# Patient Record
Sex: MALE | Race: WHITE | Employment: OTHER | URBAN - METROPOLITAN AREA
[De-identification: names, ages, dates, MRNs, and addresses within clinical notes are randomized per-mention and may not be internally consistent; named-entity substitution may affect disease eponyms.]

---

## 2018-09-17 ENCOUNTER — RECORDS - HEALTHEAST (OUTPATIENT)
Dept: LAB | Facility: CLINIC | Age: 78
End: 2018-09-17

## 2018-09-17 LAB — POTASSIUM BLD-SCNC: 3 MMOL/L (ref 3.5–5)

## 2018-09-19 ENCOUNTER — RECORDS - HEALTHEAST (OUTPATIENT)
Dept: LAB | Facility: CLINIC | Age: 78
End: 2018-09-19

## 2018-09-20 LAB — POTASSIUM BLD-SCNC: 3.5 MMOL/L (ref 3.5–5)

## 2018-10-01 ENCOUNTER — RECORDS - HEALTHEAST (OUTPATIENT)
Dept: LAB | Facility: CLINIC | Age: 78
End: 2018-10-01

## 2018-10-01 LAB — POTASSIUM BLD-SCNC: 3.2 MMOL/L (ref 3.5–5)

## 2020-11-24 ENCOUNTER — VIRTUAL VISIT (OUTPATIENT)
Dept: GERIATRICS | Facility: CLINIC | Age: 80
End: 2020-11-24
Payer: MEDICARE

## 2020-11-24 VITALS
SYSTOLIC BLOOD PRESSURE: 147 MMHG | OXYGEN SATURATION: 95 % | RESPIRATION RATE: 18 BRPM | HEART RATE: 67 BPM | TEMPERATURE: 98.3 F | DIASTOLIC BLOOD PRESSURE: 97 MMHG

## 2020-11-24 DIAGNOSIS — I69.90 LATE EFFECTS OF CVA (CEREBROVASCULAR ACCIDENT): ICD-10-CM

## 2020-11-24 DIAGNOSIS — U07.1 PNEUMONIA DUE TO COVID-19 VIRUS: Primary | ICD-10-CM

## 2020-11-24 DIAGNOSIS — I48.20 CHRONIC ATRIAL FIBRILLATION (H): ICD-10-CM

## 2020-11-24 DIAGNOSIS — J12.82 PNEUMONIA DUE TO COVID-19 VIRUS: Primary | ICD-10-CM

## 2020-11-24 DIAGNOSIS — I10 ESSENTIAL HYPERTENSION: ICD-10-CM

## 2020-11-24 DIAGNOSIS — Z79.01 CHRONIC ANTICOAGULATION: ICD-10-CM

## 2020-11-24 PROBLEM — H54.8 LEGALLY BLIND: Status: ACTIVE | Noted: 2020-11-24

## 2020-11-24 PROBLEM — J96.01 ACUTE HYPOXEMIC RESPIRATORY FAILURE DUE TO COVID-19 (H): Status: ACTIVE | Noted: 2020-11-15

## 2020-11-24 PROBLEM — N17.9 AKI (ACUTE KIDNEY INJURY) (H): Status: ACTIVE | Noted: 2020-11-16

## 2020-11-24 PROCEDURE — 99309 SBSQ NF CARE MODERATE MDM 30: CPT | Mod: 95 | Performed by: NURSE PRACTITIONER

## 2020-11-24 RX ORDER — LANOLIN ALCOHOL/MO/W.PET/CERES
1 CREAM (GRAM) TOPICAL AT BEDTIME
COMMUNITY
End: 2021-01-07

## 2020-11-24 RX ORDER — POLYETHYLENE GLYCOL 3350 17 G/17G
1 POWDER, FOR SOLUTION ORAL DAILY
COMMUNITY

## 2020-11-24 RX ORDER — ONDANSETRON 4 MG/1
4 TABLET, FILM COATED ORAL EVERY 6 HOURS PRN
COMMUNITY

## 2020-11-24 RX ORDER — LOPERAMIDE HYDROCHLORIDE 2 MG/1
2 TABLET ORAL PRN
COMMUNITY

## 2020-11-24 RX ORDER — CARBOXYMETHYLCELLULOSE SODIUM 5 MG/ML
1 SOLUTION/ DROPS OPHTHALMIC 4 TIMES DAILY
COMMUNITY

## 2020-11-24 RX ORDER — AMOXICILLIN 250 MG
1 CAPSULE ORAL DAILY PRN
COMMUNITY

## 2020-11-24 RX ORDER — FLUOXETINE 20 MG/1
20 TABLET, FILM COATED ORAL DAILY
COMMUNITY

## 2020-11-24 RX ORDER — BUPROPION HYDROCHLORIDE 150 MG/1
150 TABLET ORAL EVERY MORNING
COMMUNITY

## 2020-11-24 RX ORDER — ATORVASTATIN CALCIUM 10 MG/1
10 TABLET, FILM COATED ORAL AT BEDTIME
COMMUNITY

## 2020-11-24 RX ORDER — BISACODYL 10 MG
10 SUPPOSITORY, RECTAL RECTAL 2 TIMES DAILY PRN
COMMUNITY

## 2020-11-24 RX ORDER — AMLODIPINE BESYLATE 10 MG/1
10 TABLET ORAL DAILY
COMMUNITY

## 2020-11-24 RX ORDER — CHOLECALCIFEROL (VITAMIN D3) 50 MCG
1 TABLET ORAL DAILY
COMMUNITY

## 2020-11-24 RX ORDER — ACETAMINOPHEN 325 MG/1
650 TABLET ORAL EVERY 6 HOURS PRN
COMMUNITY

## 2020-11-24 RX ORDER — TAMSULOSIN HYDROCHLORIDE 0.4 MG/1
0.4 CAPSULE ORAL DAILY
COMMUNITY

## 2020-11-24 NOTE — PROGRESS NOTES
" [place] [facility]  Chris Orozco is being evaluated via a billable video.   The patient has been notified of following:  \"This video visit will be conducted via a call between you and your provider. We have found that certain health care needs can be provided without the need for an in-person physical exam.  This service lets us provide the care you need with a video conversation. If during the course of the call the provider feels a video visit is not appropriate, you will not be charged for this service.\"   The provider has received verbal consent for a Video Visit from the patient and or first contact? Yes  Patient/facility staff would like the video invitation sent by: N/A   Video Start Time: 10:23am  Which Facility the Patient is at during the time of visit: [facility]    Received verbal consent to use Care Everywhere in order to access labs, documents, histories, and all other needed information to provide care at current facility.    PRIMARY CARE PROVIDER AND CLINIC:  No primary care provider on file., No primary physician on file.  Chief Complaint   Patient presents with     Hospital F/U     [place] Medical Record Number:  [id number]  Chris Orozco  is a 80 year old  (1940), admitted to the above facility from  [facility] . Hospital stay 11/15/20 through 11/23/20. Admitted to this facility for  rehab, medical management and nursing care.    HPI:    HPI information obtained from: facility chart records, facility staff, patient report and Care Everywhere Epic chart review.   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 He presented to the ED with fever, malaise, respiratory distress and positive COVID test. He required BiPAP for respiratory support, weaned down to 2.5L NC at discharge. He received remdesivir and dexamethasone. He was also treated for possible aspiration pneumonia with " cefepime/doxycycline.    Updates on Status Since Skilled nursing Admission:   Patient is currently on 2L oxygen. He denies SOB, cough, malaise, N/V/D. No fever. HR 60-70s. -140s/70-90s.    CODE STATUS/ADVANCE DIRECTIVES DISCUSSION:   DNR / DNI  Patient's living condition: lives in an assisted living facility  ALLERGIES: Ciprofloxacin  PAST MEDICAL HISTORY:  has no past medical history on file.  PAST SURGICAL HISTORY:   has no past surgical history on file.  FAMILY HISTORY: family history is not on file.  SOCIAL HISTORY:     Current Outpatient Medications   Medication Sig Dispense Refill     acetaminophen (TYLENOL) 325 MG tablet Take 650 mg by mouth every 6 hours as needed for mild pain       amLODIPine (NORVASC) 10 MG tablet Take 10 mg by mouth daily       atorvastatin (LIPITOR) 10 MG tablet Take 10 mg by mouth At Bedtime       bisacodyl (DULCOLAX) 10 MG suppository Place 10 mg rectally 2 times daily as needed for constipation Administer if no BM x 3 days       buPROPion (WELLBUTRIN XL) 150 MG 24 hr tablet Take 150 mg by mouth every morning       carboxymethylcellulose PF (REFRESH PLUS) 0.5 % ophthalmic solution Place 1 drop into both eyes 4 times daily       FLUoxetine 20 MG tablet Take 20 mg by mouth daily       hypromellose (GENTEAL) 0.3 % SOLN ophthalmic solution Place 1 drop into both eyes 2 times daily       loperamide (IMODIUM A-D) 2 MG tablet Take 2 mg by mouth as needed for diarrhea Give after each loose stool       melatonin 3 MG tablet Take 1 mg by mouth At Bedtime       omeprazole (PRILOSEC) 20 MG DR capsule Take 20 mg by mouth daily       ondansetron (ZOFRAN) 4 MG tablet Take 4 mg by mouth every 6 hours as needed for nausea       polyethylene glycol (MIRALAX) 17 g packet Take 1 packet by mouth daily       rivaroxaban ANTICOAGULANT (XARELTO) 20 MG TABS tablet Take 20 mg by mouth daily (with dinner)       senna-docusate (SENOKOT-S/PERICOLACE) 8.6-50 MG tablet Take 1 tablet by mouth daily as needed  for constipation       tamsulosin (FLOMAX) 0.4 MG capsule Take 0.4 mg by mouth daily       vitamin D3 (CHOLECALCIFEROL) 50 mcg (2000 units) tablet Take 1 tablet by mouth daily        discharge medications reconciled, continue medications without change    ROS: 10 point ROS of systems including Constitutional, Eyes, Respiratory, Cardiovascular, Gastroenterology, Genitourinary, Integumentary, Musculoskeletal, Psychiatric were all negative except for pertinent positives noted in my HPI.    Vitals:BP (!) 147/97   Pulse 67   Temp 98.3  F (36.8  C)   Resp 18   SpO2 95%    Limited Visit Exam done given COVID-19 precautions:  GENERAL APPEARANCE:  Alert, in no distress  EYES:  Conjunctiva and lids normal  RESP:  no respiratory distress  PSYCH:  oriented X 3, insight and judgement impaired, memory impaired , affect and mood normal    Lab/Diagnostic data:  Recent labs in [facility] reviewed by me today.     ASSESSMENT/PLAN:  (U07.1,  J12.89) Pneumonia due to COVID-19 virus  (primary encounter diagnosis)  Comment: Improving. Due to severe illness, he requires 20 days of isolation from positive test. He likely will need ongoing TCU after that period for further strengthening before he could return to [facility].  Plan: Wean oxygen as able. Monitor vitals. PT/OT eval and treat, discharge planning per their recommendations.    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 This may slow his progress in therapy  Plan: Continue current POC with no changes at this time and adjustments as needed.    (I10) Essential hypertension  Comment: Controlled. To avoid risk of hypotension, falls, dizziness and tissue hypoperfusion, recommend  BP goal is < 150/90mmHg.  Plan: Continue current POC with no changes at this time and adjustments as needed.    (I48.20) Chronic atrial fibrillation (H)  (Z79.01) Chronic anticoagulation  Comment: Rate controlled. No s/sx side effects with DOAC  Plan: Continue  current POC with no changes at this time and adjustments as needed.      Electronically signed by:  ADRIANA Hernandes CNP   [facility]  Phone: [phone]      Video-Visit Details  Type of service:  Video Visit  Video End Time (time video stopped): 10:26am  Distant Location (provider location):  [facility]

## 2020-11-24 NOTE — LETTER
"    11/24/2020        RE: Chris Orozco  [address] [address]         [place] [facility]  Chris Orozco is being evaluated via a billable video.   The patient has been notified of following:  \"This video visit will be conducted via a call between you and your provider. We have found that certain health care needs can be provided without the need for an in-person physical exam.  This service lets us provide the care you need with a video conversation. If during the course of the call the provider feels a video visit is not appropriate, you will not be charged for this service.\"   The provider has received verbal consent for a Video Visit from the patient and or first contact? Yes  Patient/facility staff would like the video invitation sent by: N/A   Video Start Time: 10:23am  Which Facility the Patient is at during the time of visit: [facility]    Received verbal consent to use Care Everywhere in order to access labs, documents, histories, and all other needed information to provide care at current facility.    PRIMARY CARE PROVIDER AND CLINIC:  No primary care provider on file., No primary physician on file.  Chief Complaint   Patient presents with     Hospital F/U     [place] Medical Record Number:  [id number]  Chris Orozco  is a 80 year old  (1940), admitted to the above facility from  [facility] . Hospital stay 11/15/20 through 11/23/20. Admitted to this facility for  rehab, medical management and nursing care.    HPI:    HPI information obtained from: facility chart records, facility staff, patient report and Care Everywhere Epic chart review.   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 He presented to the ED with fever, malaise, respiratory distress and positive COVID test. He required BiPAP for respiratory support, weaned down to 2.5L NC at discharge. He received remdesivir " and dexamethasone. He was also treated for possible aspiration pneumonia with cefepime/doxycycline.    Updates on Status Since Skilled nursing Admission:   Patient is currently on 2L oxygen. He denies SOB, cough, malaise, N/V/D. No fever. HR 60-70s. -140s/70-90s.    CODE STATUS/ADVANCE DIRECTIVES DISCUSSION:   DNR / DNI  Patient's living condition: lives in an assisted living facility  ALLERGIES: Ciprofloxacin  PAST MEDICAL HISTORY:  has no past medical history on file.  PAST SURGICAL HISTORY:   has no past surgical history on file.  FAMILY HISTORY: family history is not on file.  SOCIAL HISTORY:     Current Outpatient Medications   Medication Sig Dispense Refill     acetaminophen (TYLENOL) 325 MG tablet Take 650 mg by mouth every 6 hours as needed for mild pain       amLODIPine (NORVASC) 10 MG tablet Take 10 mg by mouth daily       atorvastatin (LIPITOR) 10 MG tablet Take 10 mg by mouth At Bedtime       bisacodyl (DULCOLAX) 10 MG suppository Place 10 mg rectally 2 times daily as needed for constipation Administer if no BM x 3 days       buPROPion (WELLBUTRIN XL) 150 MG 24 hr tablet Take 150 mg by mouth every morning       carboxymethylcellulose PF (REFRESH PLUS) 0.5 % ophthalmic solution Place 1 drop into both eyes 4 times daily       FLUoxetine 20 MG tablet Take 20 mg by mouth daily       hypromellose (GENTEAL) 0.3 % SOLN ophthalmic solution Place 1 drop into both eyes 2 times daily       loperamide (IMODIUM A-D) 2 MG tablet Take 2 mg by mouth as needed for diarrhea Give after each loose stool       melatonin 3 MG tablet Take 1 mg by mouth At Bedtime       omeprazole (PRILOSEC) 20 MG DR capsule Take 20 mg by mouth daily       ondansetron (ZOFRAN) 4 MG tablet Take 4 mg by mouth every 6 hours as needed for nausea       polyethylene glycol (MIRALAX) 17 g packet Take 1 packet by mouth daily       rivaroxaban ANTICOAGULANT (XARELTO) 20 MG TABS tablet Take 20 mg by mouth daily (with dinner)       senna-docusate  (SENOKOT-S/PERICOLACE) 8.6-50 MG tablet Take 1 tablet by mouth daily as needed for constipation       tamsulosin (FLOMAX) 0.4 MG capsule Take 0.4 mg by mouth daily       vitamin D3 (CHOLECALCIFEROL) 50 mcg (2000 units) tablet Take 1 tablet by mouth daily        discharge medications reconciled, continue medications without change    ROS: 10 point ROS of systems including Constitutional, Eyes, Respiratory, Cardiovascular, Gastroenterology, Genitourinary, Integumentary, Musculoskeletal, Psychiatric were all negative except for pertinent positives noted in my HPI.    Vitals:BP (!) 147/97   Pulse 67   Temp 98.3  F (36.8  C)   Resp 18   SpO2 95%    Limited Visit Exam done given COVID-19 precautions:  GENERAL APPEARANCE:  Alert, in no distress  EYES:  Conjunctiva and lids normal  RESP:  no respiratory distress  PSYCH:  oriented X 3, insight and judgement impaired, memory impaired , affect and mood normal    Lab/Diagnostic data:  Recent labs in [facility] reviewed by me today.     ASSESSMENT/PLAN:  (U07.1,  J12.89) Pneumonia due to COVID-19 virus  (primary encounter diagnosis)  Comment: Improving. Due to severe illness, he requires 20 days of isolation from positive test. He likely will need ongoing TCU after that period for further strengthening before he could return to [facility].  Plan: Wean oxygen as able. Monitor vitals. PT/OT eval and treat, discharge planning per their recommendations.    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 This may slow his progress in therapy  Plan: Continue current POC with no changes at this time and adjustments as needed.    (I10) Essential hypertension  Comment: Controlled. To avoid risk of hypotension, falls, dizziness and tissue hypoperfusion, recommend  BP goal is < 150/90mmHg.  Plan: Continue current POC with no changes at this time and adjustments as needed.    (I48.20) Chronic atrial fibrillation (H)  (Z79.01) Chronic  anticoagulation  Comment: Rate controlled. No s/sx side effects with DOAC  Plan: Continue current POC with no changes at this time and adjustments as needed.      Electronically signed by:  ADRIANA Hernandes CNP   [facility]  Phone: [phone]      Video-Visit Details  Type of service:  Video Visit  Video End Time (time video stopped): 10:26am  Distant Location (provider location):  [facility]

## 2020-12-01 ENCOUNTER — VIRTUAL VISIT (OUTPATIENT)
Dept: GERIATRICS | Facility: CLINIC | Age: 80
End: 2020-12-01
Payer: MEDICARE

## 2020-12-01 VITALS
OXYGEN SATURATION: 91 % | HEIGHT: 76 IN | WEIGHT: 212.6 LBS | RESPIRATION RATE: 18 BRPM | TEMPERATURE: 98.7 F | DIASTOLIC BLOOD PRESSURE: 78 MMHG | BODY MASS INDEX: 25.89 KG/M2 | SYSTOLIC BLOOD PRESSURE: 127 MMHG | HEART RATE: 73 BPM

## 2020-12-01 DIAGNOSIS — I48.20 CHRONIC ATRIAL FIBRILLATION (H): ICD-10-CM

## 2020-12-01 DIAGNOSIS — J12.82 PNEUMONIA DUE TO COVID-19 VIRUS: Primary | ICD-10-CM

## 2020-12-01 DIAGNOSIS — I10 ESSENTIAL HYPERTENSION: ICD-10-CM

## 2020-12-01 DIAGNOSIS — Z79.01 CHRONIC ANTICOAGULATION: ICD-10-CM

## 2020-12-01 DIAGNOSIS — I69.90 LATE EFFECTS OF CVA (CEREBROVASCULAR ACCIDENT): ICD-10-CM

## 2020-12-01 DIAGNOSIS — U07.1 PNEUMONIA DUE TO COVID-19 VIRUS: Primary | ICD-10-CM

## 2020-12-01 PROBLEM — J96.01 ACUTE HYPOXEMIC RESPIRATORY FAILURE DUE TO COVID-19 (H): Status: RESOLVED | Noted: 2020-11-15 | Resolved: 2020-12-01

## 2020-12-01 PROCEDURE — 99309 SBSQ NF CARE MODERATE MDM 30: CPT | Mod: 95 | Performed by: NURSE PRACTITIONER

## 2020-12-01 ASSESSMENT — MIFFLIN-ST. JEOR: SCORE: 1775.85

## 2020-12-01 NOTE — LETTER
"    12/1/2020        RE: Chris Orozco  [address] [address]        [place] [facility]   Chris Orozco is being evaluated via a billable video visit.   The patient has been notified of following:  \"This video visit will be conducted via a call between you and your provider. We have found that certain health care needs can be provided without the need for an in-person physical exam.  This service lets us provide the care you need with a video conversation. If during the course of the call the provider feels a video visit is not appropriate, you will not be charged for this service.\"   The provider has received verbal consent for a Video Visit from the patient or first contact? Yes  Patient  or facility staff would like the video invitation sent by: N/A   Video Start Time: 10:55am    [place] Medical Record Number:  [id number]  Place of Location at the time of visit: [facility]  Chief Complaint   Patient presents with     RECHECK     HPI:  Chris Orozco  is a 80 year old (1940), who is being seen today for a visit. [facility] stay 11/15/20 through 11/23/20. Admitted to this facility for  rehab, medical management and nursing care. 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 He presented to the ED with fever, malaise, respiratory distress and positive COVID test. He required BiPAP for respiratory support, weaned down to 2.5L NC at discharge. He received remdesivir and dexamethasone. He was also treated for possible aspiration pneumonia with cefepime/doxycycline.     HPI information obtained from: facility chart records, facility staff and patient report.     Today's concern is:  Pneumonia due to COVID-19 virus  Remains on 2 liters of oxygen. He says he feels a little tired and weak still. No SOB, cough, N/V/D. Appetite is good. No fever.    Late effects of CVA (cerebrovascular accident)  Chronic left weakness and " dysarthria    Essential hypertension  -140s/70-90s    Chronic atrial fibrillation (H)  Chronic anticoagulation  HR 60-70s. On apixaban      Past Medical and Surgical History reviewed in Epic today.  MEDICATIONS:    Current Outpatient Medications   Medication Sig Dispense Refill     acetaminophen (TYLENOL) 325 MG tablet Take 650 mg by mouth every 6 hours as needed for mild pain       amLODIPine (NORVASC) 10 MG tablet Take 10 mg by mouth daily       atorvastatin (LIPITOR) 10 MG tablet Take 10 mg by mouth At Bedtime       bisacodyl (DULCOLAX) 10 MG suppository Place 10 mg rectally 2 times daily as needed for constipation Administer if no BM x 3 days       buPROPion (WELLBUTRIN XL) 150 MG 24 hr tablet Take 150 mg by mouth every morning       carboxymethylcellulose PF (REFRESH PLUS) 0.5 % ophthalmic solution Place 1 drop into both eyes 4 times daily       FLUoxetine 20 MG tablet Take 20 mg by mouth daily       hypromellose (GENTEAL) 0.3 % SOLN ophthalmic solution Place 1 drop into both eyes 2 times daily       loperamide (IMODIUM A-D) 2 MG tablet Take 2 mg by mouth as needed for diarrhea Give after each loose stool       melatonin 3 MG tablet Take 1 mg by mouth At Bedtime       omeprazole (PRILOSEC) 20 MG DR capsule Take 20 mg by mouth daily       ondansetron (ZOFRAN) 4 MG tablet Take 4 mg by mouth every 6 hours as needed for nausea       polyethylene glycol (MIRALAX) 17 g packet Take 1 packet by mouth daily       rivaroxaban ANTICOAGULANT (XARELTO) 20 MG TABS tablet Take 20 mg by mouth daily (with dinner)       senna-docusate (SENOKOT-S/PERICOLACE) 8.6-50 MG tablet Take 1 tablet by mouth daily as needed for constipation       tamsulosin (FLOMAX) 0.4 MG capsule Take 0.4 mg by mouth daily       vitamin D3 (CHOLECALCIFEROL) 50 mcg (2000 units) tablet Take 1 tablet by mouth daily       REVIEW OF SYSTEMS: 10 point ROS of systems including Constitutional, Eyes, Respiratory, Cardiovascular, Gastroenterology,  "Genitourinary, Integumentary, Musculoskeletal, Psychiatric were all negative except for pertinent positives noted in my HPI.    Objective: /78   Pulse 73   Temp 98.7  F (37.1  C)   Resp 18   Ht 1.93 m (6' 4\")   Wt 96.4 kg (212 lb 9.6 oz)   SpO2 91%   BMI 25.88 kg/m    Limited visit exam done given COVID-19 precautions.   GENERAL APPEARANCE:  Alert, in no distress  EYES:  Conjunctiva and lids normal  RESP:  no respiratory distress  PSYCH:  oriented X 3, insight and judgement impaired, memory impaired , affect and mood normal    Labs:   none    ASSESSMENT/PLAN:  (U07.1,  J12.89) Pneumonia due to COVID-19 virus  (primary encounter diagnosis)  Comment: Acute illness resolved. Fatigue and weakness can take some time to resolve. He may be removed from isolation at 20 days from positive test  Plan: PT/OT eval and treat, discharge planning per their recommendations.    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    (I10) Essential hypertension  Comment: Chronic, controlled  Plan: Continue current POC with no changes at this time and adjustments as needed.    (I48.20) Chronic atrial fibrillation (H)  (Z79.01) Chronic anticoagulation  Comment: Rate controlled. No evidence of side effects with DOAC  Plan: Continue current POC with no changes at this time and adjustments as needed.      Electronically signed by:  ADRIANA Hernandes CNP   [facility]  Phone: [phone]      Video-Visit Details  Type of service:  Video Visit  Video End Time (time video stopped): 10:57am  Distant Location (provider location):  [place] [facility]                   "

## 2020-12-01 NOTE — PROGRESS NOTES
"[place] [facility]   Chris Orozco is being evaluated via a billable video visit.   The patient has been notified of following:  \"This video visit will be conducted via a call between you and your provider. We have found that certain health care needs can be provided without the need for an in-person physical exam.  This service lets us provide the care you need with a video conversation. If during the course of the call the provider feels a video visit is not appropriate, you will not be charged for this service.\"   The provider has received verbal consent for a Video Visit from the patient or first contact? Yes  Patient  or facility staff would like the video invitation sent by: N/A   Video Start Time: 10:55am    [place] Medical Record Number:  [id number]  Place of Location at the time of visit: [facility]  Chief Complaint   Patient presents with     RECHECK     HPI:  Chris Orozco  is a 80 year old (1940), who is being seen today for a visit. [facility] stay 11/15/20 through 11/23/20. Admitted to this facility for  rehab, medical management and nursing care. 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 He presented to the ED with fever, malaise, respiratory distress and positive COVID test. He required BiPAP for respiratory support, weaned down to 2.5L NC at discharge. He received remdesivir and dexamethasone. He was also treated for possible aspiration pneumonia with cefepime/doxycycline.     HPI information obtained from: facility chart records, facility staff and patient report.     Today's concern is:  Pneumonia due to COVID-19 virus  Remains on 2 liters of oxygen. He says he feels a little tired and weak still. No SOB, cough, N/V/D. Appetite is good. No fever.    Late effects of CVA (cerebrovascular accident)  Chronic left weakness and dysarthria    Essential hypertension  -140s/70-90s    Chronic atrial fibrillation (H)  Chronic " anticoagulation  HR 60-70s. On apixaban      Past Medical and Surgical History reviewed in Epic today.  MEDICATIONS:    Current Outpatient Medications   Medication Sig Dispense Refill     acetaminophen (TYLENOL) 325 MG tablet Take 650 mg by mouth every 6 hours as needed for mild pain       amLODIPine (NORVASC) 10 MG tablet Take 10 mg by mouth daily       atorvastatin (LIPITOR) 10 MG tablet Take 10 mg by mouth At Bedtime       bisacodyl (DULCOLAX) 10 MG suppository Place 10 mg rectally 2 times daily as needed for constipation Administer if no BM x 3 days       buPROPion (WELLBUTRIN XL) 150 MG 24 hr tablet Take 150 mg by mouth every morning       carboxymethylcellulose PF (REFRESH PLUS) 0.5 % ophthalmic solution Place 1 drop into both eyes 4 times daily       FLUoxetine 20 MG tablet Take 20 mg by mouth daily       hypromellose (GENTEAL) 0.3 % SOLN ophthalmic solution Place 1 drop into both eyes 2 times daily       loperamide (IMODIUM A-D) 2 MG tablet Take 2 mg by mouth as needed for diarrhea Give after each loose stool       melatonin 3 MG tablet Take 1 mg by mouth At Bedtime       omeprazole (PRILOSEC) 20 MG DR capsule Take 20 mg by mouth daily       ondansetron (ZOFRAN) 4 MG tablet Take 4 mg by mouth every 6 hours as needed for nausea       polyethylene glycol (MIRALAX) 17 g packet Take 1 packet by mouth daily       rivaroxaban ANTICOAGULANT (XARELTO) 20 MG TABS tablet Take 20 mg by mouth daily (with dinner)       senna-docusate (SENOKOT-S/PERICOLACE) 8.6-50 MG tablet Take 1 tablet by mouth daily as needed for constipation       tamsulosin (FLOMAX) 0.4 MG capsule Take 0.4 mg by mouth daily       vitamin D3 (CHOLECALCIFEROL) 50 mcg (2000 units) tablet Take 1 tablet by mouth daily       REVIEW OF SYSTEMS: 10 point ROS of systems including Constitutional, Eyes, Respiratory, Cardiovascular, Gastroenterology, Genitourinary, Integumentary, Musculoskeletal, Psychiatric were all negative except for pertinent positives noted  "in my HPI.    Objective: /78   Pulse 73   Temp 98.7  F (37.1  C)   Resp 18   Ht 1.93 m (6' 4\")   Wt 96.4 kg (212 lb 9.6 oz)   SpO2 91%   BMI 25.88 kg/m    Limited visit exam done given COVID-19 precautions.   GENERAL APPEARANCE:  Alert, in no distress  EYES:  Conjunctiva and lids normal  RESP:  no respiratory distress  PSYCH:  oriented X 3, insight and judgement impaired, memory impaired , affect and mood normal    Labs:   none    ASSESSMENT/PLAN:  (U07.1,  J12.89) Pneumonia due to COVID-19 virus  (primary encounter diagnosis)  Comment: Acute illness resolved. Fatigue and weakness can take some time to resolve. He may be removed from isolation at 20 days from positive test  Plan: PT/OT eval and treat, discharge planning per their recommendations.    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    (I10) Essential hypertension  Comment: Chronic, controlled  Plan: Continue current POC with no changes at this time and adjustments as needed.    (I48.20) Chronic atrial fibrillation (H)  (Z79.01) Chronic anticoagulation  Comment: Rate controlled. No evidence of side effects with DOAC  Plan: Continue current POC with no changes at this time and adjustments as needed.      Electronically signed by:  ADRIANA Hernandes CNP   [facility]  Phone: [phone]      Video-Visit Details  Type of service:  Video Visit  Video End Time (time video stopped): 10:57am  Distant Location (provider location):  [place] [facility]         "

## 2020-12-10 ENCOUNTER — NURSING HOME VISIT (OUTPATIENT)
Dept: GERIATRICS | Facility: CLINIC | Age: 80
End: 2020-12-10
Payer: MEDICARE

## 2020-12-10 DIAGNOSIS — I10 ESSENTIAL HYPERTENSION: ICD-10-CM

## 2020-12-10 DIAGNOSIS — I48.20 CHRONIC ATRIAL FIBRILLATION (H): ICD-10-CM

## 2020-12-10 DIAGNOSIS — U07.1 PNEUMONIA DUE TO COVID-19 VIRUS: Primary | ICD-10-CM

## 2020-12-10 DIAGNOSIS — I69.90 LATE EFFECTS OF CVA (CEREBROVASCULAR ACCIDENT): ICD-10-CM

## 2020-12-10 DIAGNOSIS — J12.82 PNEUMONIA DUE TO COVID-19 VIRUS: Primary | ICD-10-CM

## 2020-12-11 ENCOUNTER — VIRTUAL VISIT (OUTPATIENT)
Dept: GERIATRICS | Facility: CLINIC | Age: 80
End: 2020-12-11
Payer: MEDICARE

## 2020-12-11 VITALS
HEIGHT: 76 IN | BODY MASS INDEX: 25.94 KG/M2 | TEMPERATURE: 99 F | OXYGEN SATURATION: 92 % | HEART RATE: 72 BPM | DIASTOLIC BLOOD PRESSURE: 78 MMHG | SYSTOLIC BLOOD PRESSURE: 116 MMHG | WEIGHT: 213 LBS | RESPIRATION RATE: 16 BRPM

## 2020-12-11 DIAGNOSIS — Z79.01 CHRONIC ANTICOAGULATION: ICD-10-CM

## 2020-12-11 DIAGNOSIS — I10 ESSENTIAL HYPERTENSION: ICD-10-CM

## 2020-12-11 DIAGNOSIS — I69.90 LATE EFFECTS OF CVA (CEREBROVASCULAR ACCIDENT): ICD-10-CM

## 2020-12-11 DIAGNOSIS — J12.82 PNEUMONIA DUE TO COVID-19 VIRUS: Primary | ICD-10-CM

## 2020-12-11 DIAGNOSIS — I48.20 CHRONIC ATRIAL FIBRILLATION (H): ICD-10-CM

## 2020-12-11 DIAGNOSIS — U07.1 PNEUMONIA DUE TO COVID-19 VIRUS: Primary | ICD-10-CM

## 2020-12-11 PROCEDURE — 99309 SBSQ NF CARE MODERATE MDM 30: CPT | Performed by: NURSE PRACTITIONER

## 2020-12-11 ASSESSMENT — MIFFLIN-ST. JEOR: SCORE: 1777.66

## 2020-12-11 NOTE — PROGRESS NOTES
"[place] [facility]   Chris Orozco is being evaluated via a billable video visit.   The patient has been notified of following:  \"This video visit will be conducted via a call between you and your provider. We have found that certain health care needs can be provided without the need for an in-person physical exam.  This service lets us provide the care you need with a video conversation. If during the course of the call the provider feels a video visit is not appropriate, you will not be charged for this service.\"   The provider has received verbal consent for a Video Visit from the patient or first contact? Yes  Patient  or facility staff would like the video invitation sent by: N/A   Video Start Time: 10:37am    [place] Medical Record Number:  [id number]  Place of Location at the time of visit: [facility]  Chief Complaint   Patient presents with     RECHECK     HPI:  Chris Orozco  is a 80 year old (1940), who is being seen today for a visit. [facility] stay 11/15/20 through 11/23/20. Admitted to this facility for  rehab, medical management and nursing care. 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 He presented to the ED from his skilled nursing with fever, malaise, respiratory distress and positive COVID test. He required BiPAP for respiratory support, weaned down to 2.5L NC at discharge. He received remdesivir and dexamethasone. He was also treated for possible aspiration pneumonia with cefepime/doxycycline.      HPI information obtained from: facility chart records, facility staff and patient report.     Today's concern is:  Pneumonia due to COVID-19 virus  Patient has now weaned off oxygen. He denies SOB, cough, malaise. Appetite is good     Late effects of CVA (cerebrovascular accident)  Chronic left sided weakness and dysarthria. Also with history of dysphagia, recommended to have thickened liquids, but historically has refused. He refused in the " hospital. He is currently on nectar thick liquids here, has been compliant with meals, but has been drinking water from the sink in his room. SLP is following.     Essential hypertension  -130s/80s    Chronic atrial fibrillation (H)  Chronic anticoagulation  HR 60-70s. On Xarelto. No bruising, epistaxis, hematuria, dark or bloody stools      Past Medical and Surgical History reviewed in Epic today.  MEDICATIONS:    Current Outpatient Medications   Medication Sig Dispense Refill     acetaminophen (TYLENOL) 325 MG tablet Take 650 mg by mouth every 6 hours as needed for mild pain       amLODIPine (NORVASC) 10 MG tablet Take 10 mg by mouth daily       atorvastatin (LIPITOR) 10 MG tablet Take 10 mg by mouth At Bedtime       bisacodyl (DULCOLAX) 10 MG suppository Place 10 mg rectally 2 times daily as needed for constipation Administer if no BM x 3 days       buPROPion (WELLBUTRIN XL) 150 MG 24 hr tablet Take 150 mg by mouth every morning       carboxymethylcellulose PF (REFRESH PLUS) 0.5 % ophthalmic solution Place 1 drop into both eyes 4 times daily       FLUoxetine 20 MG tablet Take 20 mg by mouth daily       hypromellose (GENTEAL) 0.3 % SOLN ophthalmic solution Place 1 drop into both eyes 2 times daily       loperamide (IMODIUM A-D) 2 MG tablet Take 2 mg by mouth as needed for diarrhea Give after each loose stool       melatonin 3 MG tablet Take 1 mg by mouth At Bedtime       omeprazole (PRILOSEC) 20 MG DR capsule Take 20 mg by mouth daily       ondansetron (ZOFRAN) 4 MG tablet Take 4 mg by mouth every 6 hours as needed for nausea       polyethylene glycol (MIRALAX) 17 g packet Take 1 packet by mouth daily       rivaroxaban ANTICOAGULANT (XARELTO) 20 MG TABS tablet Take 20 mg by mouth daily (with dinner)       senna-docusate (SENOKOT-S/PERICOLACE) 8.6-50 MG tablet Take 1 tablet by mouth daily as needed for constipation       tamsulosin (FLOMAX) 0.4 MG capsule Take 0.4 mg by mouth daily       vitamin D3  "(CHOLECALCIFEROL) 50 mcg (2000 units) tablet Take 1 tablet by mouth daily       REVIEW OF SYSTEMS: 10 point ROS of systems including Constitutional, Eyes, Respiratory, Cardiovascular, Gastroenterology, Genitourinary, Integumentary, Musculoskeletal, Psychiatric were all negative except for pertinent positives noted in my HPI.  Objective: /78   Pulse 72   Temp 99  F (37.2  C)   Resp 16   Ht 1.93 m (6' 4\")   Wt 96.6 kg (213 lb)   SpO2 92%   BMI 25.93 kg/m    Limited visit exam done given COVID-19 precautions.   GENERAL APPEARANCE:  Alert, in no distress  EYES:  Conjunctiva and lids normal  RESP:  no respiratory distress  NEURO: Speech sparse, but clear  PSYCH:  oriented X 3, insight and judgement impaired, memory impaired , affect and mood normal    Labs:   none    ASSESSMENT/PLAN:  (U07.1,  J12.89) Pneumonia due to COVID-19 virus  (primary encounter diagnosis)  Comment: Acute illness resolved, now he just need to work on getting his strength back  Plan: PT/OT eval and treat, discharge planning per their recommendations.    (I69.90) Late effects of CVA (cerebrovascular accident)  Comment: Chronic left weakness, dysarthria, dysphagia. If he is agreeable to thickened liquids, would continue with this, however most residential cannot provide this. He is also very likely to go back to thin liquids on his own once at home, as he is already doing with the sink here.   Plan: SLP eval and treat. Monitor for s/sx aspiration    (I10) Essential hypertension  Comment: Chronic, controlled  Plan: Continue current POC with no changes at this time and adjustments as needed.    (I48.20) Chronic atrial fibrillation (H)  (Z79.01) Chronic anticoagulation  Comment: HR controlled. No s/sx side effects with DOAC  Plan: Continue current POC with no changes at this time and adjustments as needed.      Electronically signed by:  ADIRANA Hernandes [facility]  Phone: [phone]      Video-Visit Details  Type of " service:  Video Visit  Video End Time (time video stopped): 10:39am  Distant Location (provider location):  [facility]

## 2020-12-11 NOTE — LETTER
"    12/11/2020        RE: Chris Orozco  [address] [id number]        [place] [facility]   Chris Orozco is being evaluated via a billable video visit.   The patient has been notified of following:  \"This video visit will be conducted via a call between you and your provider. We have found that certain health care needs can be provided without the need for an in-person physical exam.  This service lets us provide the care you need with a video conversation. If during the course of the call the provider feels a video visit is not appropriate, you will not be charged for this service.\"   The provider has received verbal consent for a Video Visit from the patient or first contact? Yes  Patient  or facility staff would like the video invitation sent by: N/A   Video Start Time: 10:37am    [place] Medical Record Number:  [id number]  Place of Location at the time of visit: [facility]  Chief Complaint   Patient presents with     RECHECK     HPI:  Chris Orozco  is a 80 year old (1940), who is being seen today for a visit. [facility] stay 11/15/20 through 11/23/20. Admitted to this facility for  rehab, medical management and nursing care. 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 He presented to the ED from his shelter with fever, malaise, respiratory distress and positive COVID test. He required BiPAP for respiratory support, weaned down to 2.5L NC at discharge. He received remdesivir and dexamethasone. He was also treated for possible aspiration pneumonia with cefepime/doxycycline.      HPI information obtained from: facility chart records, facility staff and patient report.     Today's concern is:  Pneumonia due to COVID-19 virus  Patient has now weaned off oxygen. He denies SOB, cough, malaise. Appetite is good     Late effects of CVA (cerebrovascular accident)  Chronic left sided weakness and dysarthria. Also with history " of dysphagia, recommended to have thickened liquids, but historically has refused. He refused in the hospital. He is currently on nectar thick liquids here, has been compliant with meals, but has been drinking water from the sink in his room. SLP is following.     Essential hypertension  -130s/80s    Chronic atrial fibrillation (H)  Chronic anticoagulation  HR 60-70s. On Xarelto. No bruising, epistaxis, hematuria, dark or bloody stools      Past Medical and Surgical History reviewed in Epic today.  MEDICATIONS:    Current Outpatient Medications   Medication Sig Dispense Refill     acetaminophen (TYLENOL) 325 MG tablet Take 650 mg by mouth every 6 hours as needed for mild pain       amLODIPine (NORVASC) 10 MG tablet Take 10 mg by mouth daily       atorvastatin (LIPITOR) 10 MG tablet Take 10 mg by mouth At Bedtime       bisacodyl (DULCOLAX) 10 MG suppository Place 10 mg rectally 2 times daily as needed for constipation Administer if no BM x 3 days       buPROPion (WELLBUTRIN XL) 150 MG 24 hr tablet Take 150 mg by mouth every morning       carboxymethylcellulose PF (REFRESH PLUS) 0.5 % ophthalmic solution Place 1 drop into both eyes 4 times daily       FLUoxetine 20 MG tablet Take 20 mg by mouth daily       hypromellose (GENTEAL) 0.3 % SOLN ophthalmic solution Place 1 drop into both eyes 2 times daily       loperamide (IMODIUM A-D) 2 MG tablet Take 2 mg by mouth as needed for diarrhea Give after each loose stool       melatonin 3 MG tablet Take 1 mg by mouth At Bedtime       omeprazole (PRILOSEC) 20 MG DR capsule Take 20 mg by mouth daily       ondansetron (ZOFRAN) 4 MG tablet Take 4 mg by mouth every 6 hours as needed for nausea       polyethylene glycol (MIRALAX) 17 g packet Take 1 packet by mouth daily       rivaroxaban ANTICOAGULANT (XARELTO) 20 MG TABS tablet Take 20 mg by mouth daily (with dinner)       senna-docusate (SENOKOT-S/PERICOLACE) 8.6-50 MG tablet Take 1 tablet by mouth daily as needed for  "constipation       tamsulosin (FLOMAX) 0.4 MG capsule Take 0.4 mg by mouth daily       vitamin D3 (CHOLECALCIFEROL) 50 mcg (2000 units) tablet Take 1 tablet by mouth daily       REVIEW OF SYSTEMS: 10 point ROS of systems including Constitutional, Eyes, Respiratory, Cardiovascular, Gastroenterology, Genitourinary, Integumentary, Musculoskeletal, Psychiatric were all negative except for pertinent positives noted in my HPI.  Objective: /78   Pulse 72   Temp 99  F (37.2  C)   Resp 16   Ht 1.93 m (6' 4\")   Wt 96.6 kg (213 lb)   SpO2 92%   BMI 25.93 kg/m    Limited visit exam done given COVID-19 precautions.   GENERAL APPEARANCE:  Alert, in no distress  EYES:  Conjunctiva and lids normal  RESP:  no respiratory distress  NEURO: Speech sparse, but clear  PSYCH:  oriented X 3, insight and judgement impaired, memory impaired , affect and mood normal    Labs:   none    ASSESSMENT/PLAN:  (U07.1,  J12.89) Pneumonia due to COVID-19 virus  (primary encounter diagnosis)  Comment: Acute illness resolved, now he just need to work on getting his strength back  Plan: PT/OT eval and treat, discharge planning per their recommendations.    (I69.90) Late effects of CVA (cerebrovascular accident)  Comment: Chronic left weakness, dysarthria, dysphagia. If he is agreeable to thickened liquids, would continue with this, however most half-way cannot provide this. He is also very likely to go back to thin liquids on his own once at home, as he is already doing with the sink here.   Plan: SLP eval and treat. Monitor for s/sx aspiration    (I10) Essential hypertension  Comment: Chronic, controlled  Plan: Continue current POC with no changes at this time and adjustments as needed.    (I48.20) Chronic atrial fibrillation (H)  (Z79.01) Chronic anticoagulation  Comment: HR controlled. No s/sx side effects with DOAC  Plan: Continue current POC with no changes at this time and adjustments as needed.      Electronically signed by:  Karo Hernandez " ADRIANA Velazquez [facility]  Phone: [phone]      Video-Visit Details  Type of service:  Video Visit  Video End Time (time video stopped): 10:39am  [facility]

## 2020-12-11 NOTE — PROGRESS NOTES
" [place] [facility]    Pt was seen at the [facility] on 12/10/20 for an initial TCU visit    The patient and/or legal representative have been notified of following:  \"This video visit will be conducted via a call between you and your provider. We have found that certain health care needs can be provided without the need for an in-person physical exam. This service lets us provide the care you need with a video conversation. If during the course of the call the provider feels a video visit is not appropriate, you will not be charged for this service.\"   The provider has received verbal consent for a Video Visit from the patient or first contact? Yes  Video Time: [id number]  Provider's Distant Location: [facility] Office          Chris Orozco  is a 80 year old  (1940), admitted to the above facility from  [facility] . Hospital stay 11/15/20 through 11/23/20. Admitted to this facility for  rehab, medical management and nursing care.          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 He presented to the ED with fever, malaise, respiratory distress and positive COVID test. He required BiPAP for respiratory support, weaned down to 2.5L NC at discharge. He received remdesivir and dexamethasone. He was also treated for possible aspiration pneumonia with cefepime/doxycycline.    Pt reports feeling improved  Dyspnea is improving; he no longer requires 02  He is progressing in therapy, hopes to discharge soon  He denies cough, chest pain, fevers, chills, abd pain, nausea    CODE STATUS/ADVANCE DIRECTIVES DISCUSSION:   DNR / DNI  Patient's living condition: lives in an assisted living facility  ALLERGIES: Ciprofloxacin  PAST MEDICAL HISTORY: HTN, history of CVA with L sided weakness, chronic afib on apixaban, legally blind,   PAST SURGICAL HISTORY:  L and R  TKA, GISSELL,  FAMILY HISTORY: " family history is not on file.  SOCIAL HISTORY:    occ ETOH, quite smoking 1992      Current Outpatient Medications   Medication Sig Dispense Refill     acetaminophen (TYLENOL) 325 MG tablet Take 650 mg by mouth every 6 hours as needed for mild pain       amLODIPine (NORVASC) 10 MG tablet Take 10 mg by mouth daily       atorvastatin (LIPITOR) 10 MG tablet Take 10 mg by mouth At Bedtime       bisacodyl (DULCOLAX) 10 MG suppository Place 10 mg rectally 2 times daily as needed for constipation Administer if no BM x 3 days       buPROPion (WELLBUTRIN XL) 150 MG 24 hr tablet Take 150 mg by mouth every morning       carboxymethylcellulose PF (REFRESH PLUS) 0.5 % ophthalmic solution Place 1 drop into both eyes 4 times daily       FLUoxetine 20 MG tablet Take 20 mg by mouth daily       hypromellose (GENTEAL) 0.3 % SOLN ophthalmic solution Place 1 drop into both eyes 2 times daily       loperamide (IMODIUM A-D) 2 MG tablet Take 2 mg by mouth as needed for diarrhea Give after each loose stool       melatonin 3 MG tablet Take 1 mg by mouth At Bedtime       omeprazole (PRILOSEC) 20 MG DR capsule Take 20 mg by mouth daily       ondansetron (ZOFRAN) 4 MG tablet Take 4 mg by mouth every 6 hours as needed for nausea       polyethylene glycol (MIRALAX) 17 g packet Take 1 packet by mouth daily       rivaroxaban ANTICOAGULANT (XARELTO) 20 MG TABS tablet Take 20 mg by mouth daily (with dinner)       senna-docusate (SENOKOT-S/PERICOLACE) 8.6-50 MG tablet Take 1 tablet by mouth daily as needed for constipation       tamsulosin (FLOMAX) 0.4 MG capsule Take 0.4 mg by mouth daily       vitamin D3 (CHOLECALCIFEROL) 50 mcg (2000 units) tablet Take 1 tablet by mouth daily              Vitals:   Limited Visit Exam done given COVID-19 precautions:  Pt is seen while in therapy  He appears well, is in good spirits  Face symmetric  Speech mild dysarthria  RR 12, unlabored  No LE edema  No gross focal weakness      ASSESSMENT/PLAN:  (U07.1,  J12.89)  Pneumonia due to COVID-19 virus  (primary encounter diagnosis)  Comment: Improving. Has been weaned off 02  Plan continue therapies, monitor resp status    (I69.90) Late effects of CVA (cerebrovascular accident)  Comment: Chronic mildleft weakness and dysarthria.   Plan continue therapies. Chronic Xarelto    (I10) Essential hypertension  Chronic afib  BP and HR controlled  Plan  Continue Xarelto, amlodipine      John Dent MD

## 2020-12-23 ENCOUNTER — VIRTUAL VISIT (OUTPATIENT)
Dept: GERIATRICS | Facility: CLINIC | Age: 80
End: 2020-12-23
Payer: MEDICARE

## 2020-12-23 VITALS
TEMPERATURE: 98.8 F | BODY MASS INDEX: 25.94 KG/M2 | HEART RATE: 76 BPM | SYSTOLIC BLOOD PRESSURE: 111 MMHG | HEIGHT: 76 IN | DIASTOLIC BLOOD PRESSURE: 78 MMHG | RESPIRATION RATE: 18 BRPM | WEIGHT: 213 LBS | OXYGEN SATURATION: 94 %

## 2020-12-23 DIAGNOSIS — R31.9 URINARY TRACT INFECTION WITH HEMATURIA, SITE UNSPECIFIED: Primary | ICD-10-CM

## 2020-12-23 DIAGNOSIS — N39.0 URINARY TRACT INFECTION WITH HEMATURIA, SITE UNSPECIFIED: Primary | ICD-10-CM

## 2020-12-23 PROCEDURE — 99309 SBSQ NF CARE MODERATE MDM 30: CPT | Mod: 95 | Performed by: NURSE PRACTITIONER

## 2020-12-23 RX ORDER — SULFAMETHOXAZOLE/TRIMETHOPRIM 800-160 MG
1 TABLET ORAL 2 TIMES DAILY
Refills: 0
Start: 2020-12-24 | End: 2020-12-31

## 2020-12-23 ASSESSMENT — MIFFLIN-ST. JEOR: SCORE: 1777.66

## 2020-12-23 NOTE — PROGRESS NOTES
"[place] [facility]   Chris Orozco is being evaluated via a billable video visit.   The patient has been notified of following:  \"This video visit will be conducted via a call between you and your provider. We have found that certain health care needs can be provided without the need for an in-person physical exam.  This service lets us provide the care you need with a video conversation. If during the course of the call the provider feels a video visit is not appropriate, you will not be charged for this service.\"   The provider has received verbal consent for a Video Visit from the patient or first contact? Yes  Patient  or facility staff would like the video invitation sent by: N/A   Video Start Time: 2:54pm    [place] Medical Record Number:  [id number]  Place of Location at the time of visit: [facility]  Chief Complaint   Patient presents with     Dysuria     HPI:  Chris Orozco  is a 80 year old (1940), who is being seen today for a visit.  HPI information obtained from: facility chart records, facility staff and patient report.     Today's concern is:  Urinary tract infection with hematuria, site unspecified  Nursing reported that patient's urine was milky with hematuria today. He says that it burns when he voids. He believes he is emptying his bladder fine. Temp 99.2 today. He says he has no antibiotic allergies, but Cipro is on his list. There are no records available with details on the allergy.      Past Medical and Surgical History reviewed in Epic today.  MEDICATIONS:    Current Outpatient Medications   Medication Sig Dispense Refill     acetaminophen (TYLENOL) 325 MG tablet Take 650 mg by mouth every 6 hours as needed for mild pain       amLODIPine (NORVASC) 10 MG tablet Take 10 mg by mouth daily       atorvastatin (LIPITOR) 10 MG tablet Take 10 mg by mouth At Bedtime       bisacodyl (DULCOLAX) 10 MG suppository Place 10 mg rectally 2 times daily as needed for constipation " "Administer if no BM x 3 days       buPROPion (WELLBUTRIN XL) 150 MG 24 hr tablet Take 150 mg by mouth every morning       carboxymethylcellulose PF (REFRESH PLUS) 0.5 % ophthalmic solution Place 1 drop into both eyes 4 times daily       FLUoxetine 20 MG tablet Take 20 mg by mouth daily       hypromellose (GENTEAL) 0.3 % SOLN ophthalmic solution Place 1 drop into both eyes 2 times daily       loperamide (IMODIUM A-D) 2 MG tablet Take 2 mg by mouth as needed for diarrhea Give after each loose stool       melatonin 3 MG tablet Take 1 mg by mouth At Bedtime       omeprazole (PRILOSEC) 20 MG DR capsule Take 20 mg by mouth daily       ondansetron (ZOFRAN) 4 MG tablet Take 4 mg by mouth every 6 hours as needed for nausea       polyethylene glycol (MIRALAX) 17 g packet Take 1 packet by mouth daily       rivaroxaban ANTICOAGULANT (XARELTO) 20 MG TABS tablet Take 20 mg by mouth daily (with dinner)       senna-docusate (SENOKOT-S/PERICOLACE) 8.6-50 MG tablet Take 1 tablet by mouth daily as needed for constipation       tamsulosin (FLOMAX) 0.4 MG capsule Take 0.4 mg by mouth daily       vitamin D3 (CHOLECALCIFEROL) 50 mcg (2000 units) tablet Take 1 tablet by mouth daily       REVIEW OF SYSTEMS: Limited secondary to cognitive impairment but today pt reports 4 point ROS including Respiratory, CV, GI and , other than that noted in the HPI,  is negative    Objective: /78   Pulse 76   Temp 98.8  F (37.1  C)   Resp 18   Ht 1.93 m (6' 4\")   Wt 96.6 kg (213 lb)   SpO2 94%   BMI 25.93 kg/m    Limited visit exam done given COVID-19 precautions.   GENERAL APPEARANCE:  Alert, in no distress  PSYCH:  insight and judgement impaired, memory impaired , affect and mood normal     Labs:   Recent labs in [facility] reviewed by me today.     ASSESSMENT/PLAN:  (N39.0,  R31.9) Urinary tract infection with hematuria, site unspecified  (primary encounter diagnosis)  Comment: Symptoms are consistent with UTI. He is at risk for hematuria with " UTI due to DOAC treatment. Will send a UA/UC, but with the snow storm today and the holiday, results may be delayed. His symptoms are significant enough that treatment should be started sooner than later.   Plan: After specimen is collected start Septra DS 1 tab BID x 7 days. Monitor urine clarity/color, vitals      Electronically signed by:  ADRIANA Hernandes CNP   [facility]  Phone: [phone]      Video-Visit Details  Type of service:  Video Visit  Video End Time (time video stopped): 2:56pm  Distant Location (provider location):  [facility]

## 2020-12-23 NOTE — LETTER
"    12/23/2020        RE: Chris Orozco  [address] [place] [address]        [facility]   Chris Orozco is being evaluated via a billable video visit.   The patient has been notified of following:  \"This video visit will be conducted via a call between you and your provider. We have found that certain health care needs can be provided without the need for an in-person physical exam.  This service lets us provide the care you need with a video conversation. If during the course of the call the provider feels a video visit is not appropriate, you will not be charged for this service.\"   The provider has received verbal consent for a Video Visit from the patient or first contact? Yes  Patient  or facility staff would like the video invitation sent by: N/A   Video Start Time: 2:54pm    [facility] Medical Record Number:  [id number]  Place of Location at the time of visit: [facility]  Chief Complaint   Patient presents with     Dysuria     HPI:  Chris Orozco  is a 80 year old (1940), who is being seen today for a visit.  HPI information obtained from: facility chart records, facility staff and patient report.     Today's concern is:  Urinary tract infection with hematuria, site unspecified  Nursing reported that patient's urine was milky with hematuria today. He says that it burns when he voids. He believes he is emptying his bladder fine. Temp 99.2 today. He says he has no antibiotic allergies, but Cipro is on his list. There are no records available with details on the allergy.      Past Medical and Surgical History reviewed in Epic today.  MEDICATIONS:    Current Outpatient Medications   Medication Sig Dispense Refill     acetaminophen (TYLENOL) 325 MG tablet Take 650 mg by mouth every 6 hours as needed for mild pain       amLODIPine (NORVASC) 10 MG tablet Take 10 mg by mouth daily       atorvastatin (LIPITOR) 10 MG tablet Take 10 mg by mouth At Bedtime       " "bisacodyl (DULCOLAX) 10 MG suppository Place 10 mg rectally 2 times daily as needed for constipation Administer if no BM x 3 days       buPROPion (WELLBUTRIN XL) 150 MG 24 hr tablet Take 150 mg by mouth every morning       carboxymethylcellulose PF (REFRESH PLUS) 0.5 % ophthalmic solution Place 1 drop into both eyes 4 times daily       FLUoxetine 20 MG tablet Take 20 mg by mouth daily       hypromellose (GENTEAL) 0.3 % SOLN ophthalmic solution Place 1 drop into both eyes 2 times daily       loperamide (IMODIUM A-D) 2 MG tablet Take 2 mg by mouth as needed for diarrhea Give after each loose stool       melatonin 3 MG tablet Take 1 mg by mouth At Bedtime       omeprazole (PRILOSEC) 20 MG DR capsule Take 20 mg by mouth daily       ondansetron (ZOFRAN) 4 MG tablet Take 4 mg by mouth every 6 hours as needed for nausea       polyethylene glycol (MIRALAX) 17 g packet Take 1 packet by mouth daily       rivaroxaban ANTICOAGULANT (XARELTO) 20 MG TABS tablet Take 20 mg by mouth daily (with dinner)       senna-docusate (SENOKOT-S/PERICOLACE) 8.6-50 MG tablet Take 1 tablet by mouth daily as needed for constipation       tamsulosin (FLOMAX) 0.4 MG capsule Take 0.4 mg by mouth daily       vitamin D3 (CHOLECALCIFEROL) 50 mcg (2000 units) tablet Take 1 tablet by mouth daily       REVIEW OF SYSTEMS: Limited secondary to cognitive impairment but today pt reports 4 point ROS including Respiratory, CV, GI and , other than that noted in the HPI,  is negative    Objective: /78   Pulse 76   Temp 98.8  F (37.1  C)   Resp 18   Ht 1.93 m (6' 4\")   Wt 96.6 kg (213 lb)   SpO2 94%   BMI 25.93 kg/m    Limited visit exam done given COVID-19 precautions.   GENERAL APPEARANCE:  Alert, in no distress  PSYCH:  insight and judgement impaired, memory impaired , affect and mood normal     Labs:   Recent labs in [facility] reviewed by me today.     ASSESSMENT/PLAN:  (N39.0,  R31.9) Urinary tract infection with hematuria, site unspecified  " (primary encounter diagnosis)  Comment: Symptoms are consistent with UTI. He is at risk for hematuria with UTI due to DOAC treatment. Will send a UA/UC, but with the snow storm today and the holiday, results may be delayed. His symptoms are significant enough that treatment should be started sooner than later.   Plan: After specimen is collected start Septra DS 1 tab BID x 7 days. Monitor urine clarity/color, vitals      Electronically signed by:  ADRIANA Hernandes CNP   [facility]  Phone: [phone]      Video-Visit Details  Type of service:  Video Visit  Video End Time (time video stopped): 2:56pm  Distant Location (provider location):  [facility]

## 2021-01-06 VITALS
OXYGEN SATURATION: 95 % | WEIGHT: 205 LBS | SYSTOLIC BLOOD PRESSURE: 118 MMHG | RESPIRATION RATE: 19 BRPM | HEIGHT: 76 IN | HEART RATE: 78 BPM | DIASTOLIC BLOOD PRESSURE: 85 MMHG | TEMPERATURE: 98.2 F | BODY MASS INDEX: 24.96 KG/M2

## 2021-01-06 ASSESSMENT — MIFFLIN-ST. JEOR: SCORE: 1741.37

## 2021-01-06 NOTE — PROGRESS NOTES
"[place] [facility] DISCHARGE SUMMARY  Chris Orozco is being evaluated via a billable video visit.   The patient has been notified of following:  \"This video visit will be conducted via a call between you and a [place] provider. We have found that certain health care needs can be provided without the need for an in-person physical exam.  This service lets us provide the care you need with a video conversation. If during the course of the call the provider feels a video visit is not appropriate, you will not be charged for this service.\"   The provider has received verbal consent for a Video Visit from the patient or family/first contact? Yes  Patient or /facility staff would like the video invitation sent by: N/A   Video Start Time: 1:08pm    PATIENT'S NAME: Chris Orozco  YOB: 1940  MEDICAL RECORD NUMBER:  [id number]  Place of Location at the time of visit: [facility]  PRIMARY CARE PROVIDER AND CLINIC RESPONSIBLE AFTER TRANSFER:   Provider Outside, No address on file;  Nursing Home: [facility]   Transferring providers: ADRIANA Whelan CNP; John Dent MD  Recent Hospitalization/ED:  [facility]  stay 11/15/20 to 11/23/20.  Date of SNF Admission: November / 23 / 2020  Date of SNF (anticipated) Discharge: January / 08 / 2021  Discharged to: [facility] skilled nursing facility [facility]  Cognitive Scores: BIMS: 11/15  Physical Function: EZ Stand for transfers  DME: none  CODE STATUS/ADVANCE DIRECTIVES DISCUSSION:  DNR / DNI   ALLERGIES: Ciprofloxacin    DISCHARGE DIAGNOSIS/NURSING FACILITY COURSE:   [facility] stay 11/15/20 through 11/23/20. Admitted to this facility for  rehab, medical management and nursing care. 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 He presented to the ED from his [facility] with fever, malaise, respiratory distress and positive COVID test. He required BiPAP for respiratory support, " weaned down to 2.5L NC at discharge. He received remdesivir and dexamethasone. He was also treated for possible aspiration pneumonia with cefepime/doxycycline.      Pneumonia due to COVID-19 virus  Acute infection resolved. Patient has weaned off oxygen, this took several weeks. He remains weaker than baseline and LTC has been recommended. His dysphagia has improved and he is on thin liquids now. No s/sx aspirating.    Late effects of CVA (cerebrovascular accident)  History notable for dysarthria. His speech is clear, able to make needs known. As mentioned, he requires EZ Stand for transfers and is not able to return to his senior care.     Essential hypertension  -130s/70-80s    Chronic atrial fibrillation (H)  Chronic anticoagulation  HR 60-80s. On xarelto with no evidence of side effects. He did have some hematuria with a UTI, this has resolved    Urinary tract infection with hematuria, site unspecified  Recently treated with Bactrim for UTI. Symptoms included hematuria and dysuria. These are resolved. Pharmacy had noted a sulfa allergy, but this could not be found in his record and he tolerated the Bactrim without side effects      Past Medical History:  has no past medical history on file.  Discharge Medications:    Current Outpatient Medications   Medication Sig Dispense Refill     acetaminophen (TYLENOL) 325 MG tablet Take 650 mg by mouth every 6 hours as needed for mild pain       amLODIPine (NORVASC) 10 MG tablet Take 10 mg by mouth daily       atorvastatin (LIPITOR) 10 MG tablet Take 10 mg by mouth At Bedtime       bisacodyl (DULCOLAX) 10 MG suppository Place 10 mg rectally 2 times daily as needed for constipation Administer if no BM x 3 days       buPROPion (WELLBUTRIN XL) 150 MG 24 hr tablet Take 150 mg by mouth every morning       carboxymethylcellulose PF (REFRESH PLUS) 0.5 % ophthalmic solution Place 1 drop into both eyes 4 times daily       FLUoxetine 20 MG tablet Take 20 mg by mouth daily        "hypromellose (GENTEAL) 0.3 % SOLN ophthalmic solution Place 1 drop into both eyes 2 times daily       loperamide (IMODIUM A-D) 2 MG tablet Take 2 mg by mouth as needed for diarrhea Give after each loose stool       melatonin 3 MG tablet Take 1 tablet (3 mg) by mouth At Bedtime       omeprazole (PRILOSEC) 20 MG DR capsule Take 20 mg by mouth daily       ondansetron (ZOFRAN) 4 MG tablet Take 4 mg by mouth every 6 hours as needed for nausea       polyethylene glycol (MIRALAX) 17 g packet Take 1 packet by mouth daily       rivaroxaban ANTICOAGULANT (XARELTO) 20 MG TABS tablet Take 20 mg by mouth daily (with dinner)       senna-docusate (SENOKOT-S/PERICOLACE) 8.6-50 MG tablet Take 1 tablet by mouth daily as needed for constipation       tamsulosin (FLOMAX) 0.4 MG capsule Take 0.4 mg by mouth daily       vitamin D3 (CHOLECALCIFEROL) 50 mcg (2000 units) tablet Take 1 tablet by mouth daily        Medication Changes/Rationale:     none  Controlled medications sent with patient:   not applicable/none     ROS: 10 point ROS of systems including Constitutional, Eyes, Respiratory, Cardiovascular, Gastroenterology, Genitourinary, Integumentary, Musculoskeletal, Psychiatric were all negative except for pertinent positives noted in my HPI.    Physical Exam: Vitals: /85   Pulse 78   Temp 98.2  F (36.8  C)   Resp 19   Ht 1.93 m (6' 4\")   Wt 93 kg (205 lb)   SpO2 95%   BMI 24.95 kg/m   BMI= Body mass index is 24.95 kg/m .  Limited Visit exam done for COVID-19 precautions  GENERAL APPEARANCE:  Alert, in no distress  EYES:  Conjunctiva and lids normal  RESP:  no respiratory distress  NEURO:   speech clear  PSYCH:  insight and judgement impaired, memory impaired , affect and mood normal     SNF labs: none    DISCHARGE PLAN:    Follow up labs: No labs orders/due    Medical Follow Up:      Follow up with primary care provider in 1-2 weeks      TOTAL DISCHARGE TIME:   Greater than 30 minutes  Electronically signed by:  Karo" ADRIANA Carrizales [facility]  Phone: [phone]      Video-Visit Details  Type of service:  Video Visit  Video End Time (time video stopped): 1:10pm  Distant Location (provider location):  [facility]

## 2021-01-07 ENCOUNTER — VIRTUAL VISIT (OUTPATIENT)
Dept: GERIATRICS | Facility: CLINIC | Age: 81
End: 2021-01-07
Payer: MEDICARE

## 2021-01-07 DIAGNOSIS — I69.90 LATE EFFECTS OF CVA (CEREBROVASCULAR ACCIDENT): ICD-10-CM

## 2021-01-07 DIAGNOSIS — R31.9 URINARY TRACT INFECTION WITH HEMATURIA, SITE UNSPECIFIED: ICD-10-CM

## 2021-01-07 DIAGNOSIS — I48.20 CHRONIC ATRIAL FIBRILLATION (H): ICD-10-CM

## 2021-01-07 DIAGNOSIS — N39.0 URINARY TRACT INFECTION WITH HEMATURIA, SITE UNSPECIFIED: ICD-10-CM

## 2021-01-07 DIAGNOSIS — Z79.01 CHRONIC ANTICOAGULATION: ICD-10-CM

## 2021-01-07 DIAGNOSIS — J12.82 PNEUMONIA DUE TO COVID-19 VIRUS: Primary | ICD-10-CM

## 2021-01-07 DIAGNOSIS — U07.1 PNEUMONIA DUE TO COVID-19 VIRUS: Primary | ICD-10-CM

## 2021-01-07 DIAGNOSIS — I10 ESSENTIAL HYPERTENSION: ICD-10-CM

## 2021-01-07 PROCEDURE — 99316 NF DSCHRG MGMT 30 MIN+: CPT | Mod: 95 | Performed by: NURSE PRACTITIONER

## 2021-01-07 RX ORDER — LANOLIN ALCOHOL/MO/W.PET/CERES
3 CREAM (GRAM) TOPICAL AT BEDTIME
Start: 2021-01-07

## 2021-01-07 NOTE — LETTER
"    1/7/2021        RE: Chris Orozco  [address] [address]        [place] [facility] DISCHARGE SUMMARY  Chris Orozco is being evaluated via a billable video visit.   The patient has been notified of following:  \"This video visit will be conducted via a call between you and a [place] provider. We have found that certain health care needs can be provided without the need for an in-person physical exam.  This service lets us provide the care you need with a video conversation. If during the course of the call the provider feels a video visit is not appropriate, you will not be charged for this service.\"   The provider has received verbal consent for a Video Visit from the patient or family/first contact? Yes  Patient or /facility staff would like the video invitation sent by: N/A   Video Start Time: 1:08pm    PATIENT'S NAME: Chris Orozco  YOB: 1940  MEDICAL RECORD NUMBER:  [id number]  Place of Location at the time of visit: [facility]  PRIMARY CARE PROVIDER AND CLINIC RESPONSIBLE AFTER TRANSFER:   Provider Outside, No address on file;  Nursing Home: [facility]   Transferring providers: ADRIANA Whelan CNP; John Dent MD  Recent Hospitalization/ED:  Hospital  [facility]  stay 11/15/20 to 11/23/20.  Date of SNF Admission: November / 23 / 2020  Date of SNF (anticipated) Discharge: January / 08 / 2021  Discharged to: [facility] skilled nursing facility [facility]  Cognitive Scores: BIMS: 11/15  Physical Function: EZ Stand for transfers  DME: none  CODE STATUS/ADVANCE DIRECTIVES DISCUSSION:  DNR / DNI   ALLERGIES: Ciprofloxacin    DISCHARGE DIAGNOSIS/NURSING FACILITY COURSE:   [facility] stay 11/15/20 through 11/23/20. Admitted to this facility for  rehab, medical management and nursing care. 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 He presented to the ED from his [facility] with fever, " malaise, respiratory distress and positive COVID test. He required BiPAP for respiratory support, weaned down to 2.5L NC at discharge. He received remdesivir and dexamethasone. He was also treated for possible aspiration pneumonia with cefepime/doxycycline.      Pneumonia due to COVID-19 virus  Acute infection resolved. Patient has weaned off oxygen, this took several weeks. He remains weaker than baseline and LTC has been recommended. His dysphagia has improved and he is on thin liquids now. No s/sx aspirating.    Late effects of CVA (cerebrovascular accident)  History notable for dysarthria. His speech is clear, able to make needs known. As mentioned, he requires EZ Stand for transfers and is not able to return to his nursing home.     Essential hypertension  -130s/70-80s    Chronic atrial fibrillation (H)  Chronic anticoagulation  HR 60-80s. On xarelto with no evidence of side effects. He did have some hematuria with a UTI, this has resolved    Urinary tract infection with hematuria, site unspecified  Recently treated with Bactrim for UTI. Symptoms included hematuria and dysuria. These are resolved. Pharmacy had noted a sulfa allergy, but this could not be found in his record and he tolerated the Bactrim without side effects      Past Medical History:  has no past medical history on file.  Discharge Medications:    Current Outpatient Medications   Medication Sig Dispense Refill     acetaminophen (TYLENOL) 325 MG tablet Take 650 mg by mouth every 6 hours as needed for mild pain       amLODIPine (NORVASC) 10 MG tablet Take 10 mg by mouth daily       atorvastatin (LIPITOR) 10 MG tablet Take 10 mg by mouth At Bedtime       bisacodyl (DULCOLAX) 10 MG suppository Place 10 mg rectally 2 times daily as needed for constipation Administer if no BM x 3 days       buPROPion (WELLBUTRIN XL) 150 MG 24 hr tablet Take 150 mg by mouth every morning       carboxymethylcellulose PF (REFRESH PLUS) 0.5 % ophthalmic solution Place 1 drop  "into both eyes 4 times daily       FLUoxetine 20 MG tablet Take 20 mg by mouth daily       hypromellose (GENTEAL) 0.3 % SOLN ophthalmic solution Place 1 drop into both eyes 2 times daily       loperamide (IMODIUM A-D) 2 MG tablet Take 2 mg by mouth as needed for diarrhea Give after each loose stool       melatonin 3 MG tablet Take 1 tablet (3 mg) by mouth At Bedtime       omeprazole (PRILOSEC) 20 MG DR capsule Take 20 mg by mouth daily       ondansetron (ZOFRAN) 4 MG tablet Take 4 mg by mouth every 6 hours as needed for nausea       polyethylene glycol (MIRALAX) 17 g packet Take 1 packet by mouth daily       rivaroxaban ANTICOAGULANT (XARELTO) 20 MG TABS tablet Take 20 mg by mouth daily (with dinner)       senna-docusate (SENOKOT-S/PERICOLACE) 8.6-50 MG tablet Take 1 tablet by mouth daily as needed for constipation       tamsulosin (FLOMAX) 0.4 MG capsule Take 0.4 mg by mouth daily       vitamin D3 (CHOLECALCIFEROL) 50 mcg (2000 units) tablet Take 1 tablet by mouth daily        Medication Changes/Rationale:     none  Controlled medications sent with patient:   not applicable/none     ROS: 10 point ROS of systems including Constitutional, Eyes, Respiratory, Cardiovascular, Gastroenterology, Genitourinary, Integumentary, Musculoskeletal, Psychiatric were all negative except for pertinent positives noted in my HPI.    Physical Exam: Vitals: /85   Pulse 78   Temp 98.2  F (36.8  C)   Resp 19   Ht 1.93 m (6' 4\")   Wt 93 kg (205 lb)   SpO2 95%   BMI 24.95 kg/m   BMI= Body mass index is 24.95 kg/m .  Limited Visit exam done for COVID-19 precautions  GENERAL APPEARANCE:  Alert, in no distress  EYES:  Conjunctiva and lids normal  RESP:  no respiratory distress  NEURO:   speech clear  PSYCH:  insight and judgement impaired, memory impaired , affect and mood normal     SNF labs: none    DISCHARGE PLAN:    Follow up labs: No labs orders/due    Medical Follow Up:      Follow up with primary care provider in 1-2 " weeks      TOTAL DISCHARGE TIME:   Greater than 30 minutes  Electronically signed by:  ADRIANA Hernandes CNP   [facility]  Phone: [phone]      Video-Visit Details  Type of service:  Video Visit  Video End Time (time video stopped): 1:10pm  Distant Location (provider location):  [facility]